# Patient Record
Sex: FEMALE | Race: WHITE | Employment: OTHER | URBAN - NONMETROPOLITAN AREA
[De-identification: names, ages, dates, MRNs, and addresses within clinical notes are randomized per-mention and may not be internally consistent; named-entity substitution may affect disease eponyms.]

---

## 2017-04-28 ENCOUNTER — TELEPHONE (OUTPATIENT)
Dept: FAMILY MEDICINE CLINIC | Age: 77
End: 2017-04-28

## 2017-05-03 ENCOUNTER — CARE COORDINATION (OUTPATIENT)
Dept: CARE COORDINATION | Age: 77
End: 2017-05-03

## 2017-05-03 ENCOUNTER — TELEPHONE (OUTPATIENT)
Dept: FAMILY MEDICINE CLINIC | Age: 77
End: 2017-05-03

## 2017-05-09 ENCOUNTER — OFFICE VISIT (OUTPATIENT)
Dept: FAMILY MEDICINE CLINIC | Age: 77
End: 2017-05-09

## 2017-05-09 VITALS
SYSTOLIC BLOOD PRESSURE: 116 MMHG | TEMPERATURE: 98.2 F | DIASTOLIC BLOOD PRESSURE: 64 MMHG | WEIGHT: 113 LBS | OXYGEN SATURATION: 96 % | HEART RATE: 74 BPM | HEIGHT: 60 IN | BODY MASS INDEX: 22.19 KG/M2

## 2017-05-09 DIAGNOSIS — K52.9 GASTROENTERITIS: ICD-10-CM

## 2017-05-09 DIAGNOSIS — I10 ESSENTIAL HYPERTENSION, BENIGN: ICD-10-CM

## 2017-05-09 DIAGNOSIS — K58.0 IRRITABLE BOWEL SYNDROME WITH DIARRHEA: ICD-10-CM

## 2017-05-09 DIAGNOSIS — M54.50 CHRONIC MIDLINE LOW BACK PAIN WITHOUT SCIATICA: ICD-10-CM

## 2017-05-09 DIAGNOSIS — M54.50 MIDLINE LOW BACK PAIN WITHOUT SCIATICA, UNSPECIFIED CHRONICITY: ICD-10-CM

## 2017-05-09 DIAGNOSIS — G89.29 CHRONIC MIDLINE LOW BACK PAIN WITHOUT SCIATICA: ICD-10-CM

## 2017-05-09 DIAGNOSIS — N39.46 MIXED INCONTINENCE: ICD-10-CM

## 2017-05-09 DIAGNOSIS — F31.75 BIPOLAR DISORDER, IN PARTIAL REMISSION, MOST RECENT EPISODE DEPRESSED (HCC): Primary | ICD-10-CM

## 2017-05-09 DIAGNOSIS — M15.9 PRIMARY OSTEOARTHRITIS INVOLVING MULTIPLE JOINTS: ICD-10-CM

## 2017-05-09 PROCEDURE — 99214 OFFICE O/P EST MOD 30 MIN: CPT | Performed by: FAMILY MEDICINE

## 2017-05-09 RX ORDER — DARIFENACIN HYDROBROMIDE 7.5 MG/1
7.5 TABLET, EXTENDED RELEASE ORAL DAILY
COMMUNITY
End: 2020-04-14

## 2017-05-12 PROBLEM — N39.46 MIXED INCONTINENCE: Status: ACTIVE | Noted: 2017-05-12

## 2017-05-17 RX ORDER — ESOMEPRAZOLE MAGNESIUM 40 MG/1
CAPSULE, DELAYED RELEASE ORAL
Qty: 30 CAPSULE | Refills: 0 | Status: SHIPPED | OUTPATIENT
Start: 2017-05-17 | End: 2017-06-20 | Stop reason: SDUPTHER

## 2017-05-24 ENCOUNTER — OFFICE VISIT (OUTPATIENT)
Dept: FAMILY MEDICINE CLINIC | Age: 77
End: 2017-05-24

## 2017-05-24 VITALS
SYSTOLIC BLOOD PRESSURE: 126 MMHG | HEIGHT: 60 IN | WEIGHT: 113.6 LBS | OXYGEN SATURATION: 97 % | BODY MASS INDEX: 22.3 KG/M2 | HEART RATE: 118 BPM | DIASTOLIC BLOOD PRESSURE: 76 MMHG

## 2017-05-24 DIAGNOSIS — Z23 NEED FOR PROPHYLACTIC VACCINATION AGAINST DIPHTHERIA-TETANUS-PERTUSSIS (DTP): Primary | ICD-10-CM

## 2017-05-24 DIAGNOSIS — I10 ESSENTIAL HYPERTENSION, BENIGN: ICD-10-CM

## 2017-05-24 DIAGNOSIS — M15.9 PRIMARY OSTEOARTHRITIS INVOLVING MULTIPLE JOINTS: ICD-10-CM

## 2017-05-24 DIAGNOSIS — M54.50 CHRONIC MIDLINE LOW BACK PAIN WITHOUT SCIATICA: ICD-10-CM

## 2017-05-24 DIAGNOSIS — J43.1 PANLOBULAR EMPHYSEMA (HCC): ICD-10-CM

## 2017-05-24 DIAGNOSIS — F31.75 BIPOLAR DISORDER, IN PARTIAL REMISSION, MOST RECENT EPISODE DEPRESSED (HCC): ICD-10-CM

## 2017-05-24 DIAGNOSIS — D64.9 ANEMIA, UNSPECIFIED TYPE: ICD-10-CM

## 2017-05-24 DIAGNOSIS — E78.2 MIXED HYPERLIPIDEMIA: ICD-10-CM

## 2017-05-24 DIAGNOSIS — N39.46 MIXED INCONTINENCE: ICD-10-CM

## 2017-05-24 DIAGNOSIS — G89.29 CHRONIC MIDLINE LOW BACK PAIN WITHOUT SCIATICA: ICD-10-CM

## 2017-05-24 DIAGNOSIS — L65.9 ALOPECIA: ICD-10-CM

## 2017-05-24 PROCEDURE — 99214 OFFICE O/P EST MOD 30 MIN: CPT | Performed by: FAMILY MEDICINE

## 2017-05-24 RX ORDER — TIMOLOL MALEATE 5 MG/ML
SOLUTION/ DROPS OPHTHALMIC
COMMUNITY
Start: 2017-05-15 | End: 2017-10-03 | Stop reason: SDUPTHER

## 2017-06-13 ENCOUNTER — NURSE ONLY (OUTPATIENT)
Dept: FAMILY MEDICINE CLINIC | Age: 77
End: 2017-06-13

## 2017-06-13 ENCOUNTER — TELEPHONE (OUTPATIENT)
Dept: FAMILY MEDICINE CLINIC | Age: 77
End: 2017-06-13

## 2017-06-13 DIAGNOSIS — E78.2 MIXED HYPERLIPIDEMIA: ICD-10-CM

## 2017-06-13 DIAGNOSIS — L65.9 ALOPECIA: ICD-10-CM

## 2017-06-13 DIAGNOSIS — D64.9 ANEMIA, UNSPECIFIED TYPE: ICD-10-CM

## 2017-06-13 LAB
ALBUMIN SERPL-MCNC: 4.2 G/DL (ref 3.4–5)
ALP BLD-CCNC: 51 U/L (ref 40–129)
ALT SERPL-CCNC: 10 U/L (ref 10–40)
AST SERPL-CCNC: 17 U/L (ref 15–37)
BASOPHILS ABSOLUTE: 0.1 K/UL (ref 0–0.2)
BASOPHILS RELATIVE PERCENT: 1.8 %
BILIRUB SERPL-MCNC: <0.2 MG/DL (ref 0–1)
BILIRUBIN DIRECT: <0.2 MG/DL (ref 0–0.3)
BILIRUBIN, INDIRECT: NORMAL MG/DL (ref 0–1)
CHOLESTEROL, TOTAL: 236 MG/DL (ref 0–199)
EOSINOPHILS ABSOLUTE: 0.8 K/UL (ref 0–0.6)
EOSINOPHILS RELATIVE PERCENT: 9.5 %
HCT VFR BLD CALC: 34.8 % (ref 36–48)
HDLC SERPL-MCNC: 65 MG/DL (ref 40–60)
HEMOGLOBIN: 11.3 G/DL (ref 12–16)
LDL CHOLESTEROL CALCULATED: 121 MG/DL
LYMPHOCYTES ABSOLUTE: 2.7 K/UL (ref 1–5.1)
LYMPHOCYTES RELATIVE PERCENT: 33.4 %
MCH RBC QN AUTO: 30.2 PG (ref 26–34)
MCHC RBC AUTO-ENTMCNC: 32.4 G/DL (ref 31–36)
MCV RBC AUTO: 93.3 FL (ref 80–100)
MONOCYTES ABSOLUTE: 0.9 K/UL (ref 0–1.3)
MONOCYTES RELATIVE PERCENT: 11.2 %
NEUTROPHILS ABSOLUTE: 3.6 K/UL (ref 1.7–7.7)
NEUTROPHILS RELATIVE PERCENT: 44.1 %
PDW BLD-RTO: 13.9 % (ref 12.4–15.4)
PLATELET # BLD: 334 K/UL (ref 135–450)
PMV BLD AUTO: 9 FL (ref 5–10.5)
RBC # BLD: 3.73 M/UL (ref 4–5.2)
T4 FREE: 1.1 NG/DL (ref 0.9–1.8)
TOTAL PROTEIN: 7 G/DL (ref 6.4–8.2)
TRIGL SERPL-MCNC: 252 MG/DL (ref 0–150)
TSH SERPL DL<=0.05 MIU/L-ACNC: 5.2 UIU/ML (ref 0.27–4.2)
VLDLC SERPL CALC-MCNC: 50 MG/DL
WBC # BLD: 8.2 K/UL (ref 4–11)

## 2017-06-13 PROCEDURE — 36415 COLL VENOUS BLD VENIPUNCTURE: CPT | Performed by: FAMILY MEDICINE

## 2017-06-19 DIAGNOSIS — E78.2 MIXED HYPERLIPIDEMIA: Primary | ICD-10-CM

## 2017-06-21 RX ORDER — ESOMEPRAZOLE MAGNESIUM 40 MG/1
CAPSULE, DELAYED RELEASE ORAL
Qty: 30 CAPSULE | Refills: 0 | Status: SHIPPED | OUTPATIENT
Start: 2017-06-21 | End: 2017-07-21 | Stop reason: SDUPTHER

## 2017-07-03 ENCOUNTER — HOSPITAL ENCOUNTER (OUTPATIENT)
Dept: OTHER | Age: 77
Discharge: HOME | End: 2017-07-03
Attending: INTERNAL MEDICINE | Admitting: INTERNAL MEDICINE

## 2017-07-03 VITALS
TEMPERATURE: 98 F | BODY MASS INDEX: 22.58 KG/M2 | SYSTOLIC BLOOD PRESSURE: 173 MMHG | RESPIRATION RATE: 16 BRPM | HEART RATE: 86 BPM | HEIGHT: 60 IN | OXYGEN SATURATION: 92 % | WEIGHT: 115 LBS | DIASTOLIC BLOOD PRESSURE: 86 MMHG

## 2017-07-03 RX ORDER — SODIUM CHLORIDE, SODIUM LACTATE, POTASSIUM CHLORIDE, CALCIUM CHLORIDE 600; 310; 30; 20 MG/100ML; MG/100ML; MG/100ML; MG/100ML
INJECTION, SOLUTION INTRAVENOUS CONTINUOUS
Status: DISCONTINUED | OUTPATIENT
Start: 2017-07-03 | End: 2017-07-04 | Stop reason: HOSPADM

## 2017-07-03 RX ADMIN — SODIUM CHLORIDE, SODIUM LACTATE, POTASSIUM CHLORIDE, CALCIUM CHLORIDE: 600; 310; 30; 20 INJECTION, SOLUTION INTRAVENOUS at 07:54

## 2017-07-03 ASSESSMENT — PAIN DESCRIPTION - DESCRIPTORS: DESCRIPTORS: ACHING

## 2017-07-03 ASSESSMENT — PAIN - FUNCTIONAL ASSESSMENT: PAIN_FUNCTIONAL_ASSESSMENT: 0-10

## 2017-07-21 RX ORDER — ESOMEPRAZOLE MAGNESIUM 40 MG/1
CAPSULE, DELAYED RELEASE ORAL
Qty: 30 CAPSULE | Refills: 0 | Status: SHIPPED | OUTPATIENT
Start: 2017-07-21 | End: 2017-08-21 | Stop reason: SDUPTHER

## 2017-07-25 DIAGNOSIS — J30.89 ENVIRONMENTAL AND SEASONAL ALLERGIES: ICD-10-CM

## 2017-07-25 DIAGNOSIS — M15.9 PRIMARY OSTEOARTHRITIS INVOLVING MULTIPLE JOINTS: ICD-10-CM

## 2017-07-25 RX ORDER — LORATADINE 10 MG/1
TABLET ORAL
Qty: 90 TABLET | Refills: 3 | Status: SHIPPED | OUTPATIENT
Start: 2017-07-25

## 2017-08-21 RX ORDER — ESOMEPRAZOLE MAGNESIUM 40 MG/1
CAPSULE, DELAYED RELEASE ORAL
Qty: 30 CAPSULE | Refills: 0 | Status: SHIPPED | OUTPATIENT
Start: 2017-08-21 | End: 2017-10-03 | Stop reason: SDUPTHER

## 2017-08-28 RX ORDER — ESOMEPRAZOLE MAGNESIUM 40 MG/1
CAPSULE, DELAYED RELEASE ORAL
Qty: 30 CAPSULE | Refills: 0 | Status: SHIPPED | OUTPATIENT
Start: 2017-08-28 | End: 2017-10-03 | Stop reason: SDUPTHER

## 2017-10-03 ENCOUNTER — OFFICE VISIT (OUTPATIENT)
Dept: FAMILY MEDICINE CLINIC | Age: 77
End: 2017-10-03

## 2017-10-03 VITALS
WEIGHT: 131.6 LBS | OXYGEN SATURATION: 98 % | HEART RATE: 76 BPM | HEIGHT: 60 IN | SYSTOLIC BLOOD PRESSURE: 122 MMHG | BODY MASS INDEX: 25.84 KG/M2 | DIASTOLIC BLOOD PRESSURE: 66 MMHG

## 2017-10-03 DIAGNOSIS — M15.9 PRIMARY OSTEOARTHRITIS INVOLVING MULTIPLE JOINTS: ICD-10-CM

## 2017-10-03 DIAGNOSIS — N39.46 MIXED INCONTINENCE: ICD-10-CM

## 2017-10-03 DIAGNOSIS — E78.2 MIXED HYPERLIPIDEMIA: Primary | ICD-10-CM

## 2017-10-03 DIAGNOSIS — K21.9 GASTROESOPHAGEAL REFLUX DISEASE WITHOUT ESOPHAGITIS: ICD-10-CM

## 2017-10-03 DIAGNOSIS — R73.9 HYPERGLYCEMIA: ICD-10-CM

## 2017-10-03 DIAGNOSIS — I10 ESSENTIAL HYPERTENSION, BENIGN: ICD-10-CM

## 2017-10-03 DIAGNOSIS — F41.9 ANXIETY: ICD-10-CM

## 2017-10-03 DIAGNOSIS — Z23 NEED FOR INFLUENZA VACCINATION: ICD-10-CM

## 2017-10-03 DIAGNOSIS — G51.0 FACIAL PARALYSIS/BELLS PALSY: Chronic | ICD-10-CM

## 2017-10-03 DIAGNOSIS — F31.75 BIPOLAR DISORDER, IN PARTIAL REMISSION, MOST RECENT EPISODE DEPRESSED (HCC): ICD-10-CM

## 2017-10-03 LAB
ALBUMIN SERPL-MCNC: 4 G/DL (ref 3.4–5)
ALP BLD-CCNC: 69 U/L (ref 40–129)
ALT SERPL-CCNC: 13 U/L (ref 10–40)
AST SERPL-CCNC: 17 U/L (ref 15–37)
BILIRUB SERPL-MCNC: <0.2 MG/DL (ref 0–1)
BILIRUBIN DIRECT: <0.2 MG/DL (ref 0–0.3)
BILIRUBIN, INDIRECT: NORMAL MG/DL (ref 0–1)
CHOLESTEROL, TOTAL: 238 MG/DL (ref 0–199)
HDLC SERPL-MCNC: 56 MG/DL (ref 40–60)
LDL CHOLESTEROL CALCULATED: ABNORMAL MG/DL
LDL CHOLESTEROL DIRECT: 122 MG/DL
TOTAL PROTEIN: 7 G/DL (ref 6.4–8.2)
TRIGL SERPL-MCNC: 383 MG/DL (ref 0–150)
VLDLC SERPL CALC-MCNC: ABNORMAL MG/DL

## 2017-10-03 PROCEDURE — G0008 ADMIN INFLUENZA VIRUS VAC: HCPCS | Performed by: FAMILY MEDICINE

## 2017-10-03 PROCEDURE — 90688 IIV4 VACCINE SPLT 0.5 ML IM: CPT | Performed by: FAMILY MEDICINE

## 2017-10-03 PROCEDURE — 36415 COLL VENOUS BLD VENIPUNCTURE: CPT | Performed by: FAMILY MEDICINE

## 2017-10-03 PROCEDURE — 99214 OFFICE O/P EST MOD 30 MIN: CPT | Performed by: FAMILY MEDICINE

## 2017-10-03 RX ORDER — ESOMEPRAZOLE MAGNESIUM 40 MG/1
CAPSULE, DELAYED RELEASE ORAL
Qty: 30 CAPSULE | Refills: 11 | Status: SHIPPED | OUTPATIENT
Start: 2017-10-03

## 2017-10-03 RX ORDER — TIMOLOL MALEATE 5 MG/ML
1 SOLUTION/ DROPS OPHTHALMIC 2 TIMES DAILY
Qty: 1 BOTTLE | Refills: 1 | Status: SHIPPED | OUTPATIENT
Start: 2017-10-03

## 2017-10-03 NOTE — PATIENT INSTRUCTIONS

## 2017-10-03 NOTE — MR AVS SNAPSHOT
After Visit Summary             Saniya Roman   10/3/2017 8:40 AM   Office Visit    Description:  Female : 1940   Provider:  Hollie Giles MD   Department:  [facility]              Your Follow-Up and Future Appointments         Below is a list of your follow-up and future appointments. This may not be a complete list as you may have made appointments directly with providers that we are not aware of or your providers may have made some for you. Please call your providers to confirm appointments. It is important to keep your appointments. Please bring your current insurance card, photo ID, co-pay, and all medication bottles to your appointment. If self-pay, payment is expected at the time of service. Your To-Do List     Future Appointments Provider Department Dept Phone    2018 10:40 AM Hollie Giles MD [address] [place] [address] [phone]    Please arrive 15 minutes prior to appointment, bring photo ID and insurance card. Follow-Up    Return in about 4 months (around 2/3/2018), or HTN;hyperlipidemia;GERD;hyperglycemia. Information from Your Visit        Department     Name Address Phone Fax    [address] [place] [address] [address] [facility] [phone]      You Were Seen for:         Comments    Need for influenza vaccination   [[id number]]         Vital Signs     Blood Pressure Pulse Height Weight Last Menstrual Period Oxygen Saturation    122/66 (Site: Left Arm, Position: Sitting, Cuff Size: Medium Adult) 76 4' 11.5\" (1.511 m) 131 lb 9.6 oz (59.7 kg) (LMP Unknown) 98%    Body Mass Index Smoking Status                26.14 kg/m2 Never Smoker          Additional Information about your Body Mass Index (BMI)           Your BMI as listed above is considered overweight (25.0-29.9). BMI is an estimate of body fat, calculated from your height and weight.   The higher Lyrica [Pregabalin] Nausea And Vomiting    Penicillins Nausea And Vomiting    Propoxyphene Nausea And Vomiting    Sulfa Antibiotics Nausea And Vomiting    Tuberculin Tests Nausea And Vomiting    Vioxx Nausea And Vomiting      We Ordered/Performed the following           HEPATIC FUNCTION PANEL     INFLUENZA, QUADV, 3 YRS AND OLDER, IM, MDV, 0.5ML (FLUZONE QUADV)     LIPID PANEL          Additional Information        Basic Information     Date Of Birth Sex Race Ethnicity Preferred Language    1940 Female White Non-/Non  English      Problem List as of 10/3/2017  Date Reviewed: 10/3/2017                Mixed incontinence    Mixed hyperlipidemia    Hyperglycemia    Panlobular emphysema ([facility] [address]    Bipolar disorder, in partial remission, most recent episode depressed ([facility] [address]    Primary osteoarthritis involving multiple joints    Midline low back pain without sciatica    Seborrheic keratosis    Low back pain    Essential hypertension, benign    Costochondritis    Facial paralysis/[place] palsy (Chronic)    Nausea without vomiting    GERD (gastroesophageal reflux disease)    Insomnia    Anxiety    Memory loss      Your Goals as of 10/3/2017 at 9:27 AM              Today    5/24/17 12/12/16       Lifestyle    Schedule follow up appointment   On track  On track  On track    Notes    ? I will schedule the recommended follow up visit when leaving the office today, and agree to keep the appointment or to reschedule when I call to cancel. ?       medications   On track  On track      Notes    I will take all medications, as prescribed by my doctor, and I will call the office if I am having any medication problems. healthy diet           Notes    I will follow a healthy diet, as suggested by my doctor. Vaccines           Notes    I will consider obtaining any vaccines recommended by my doctor.           Immunizations as of 10/3/2017     Name Date Influenza Virus Vaccine 9/23/2015, 10/28/2014, 10/30/2013    Influenza, Scott Sandoval, 3 Years and older, IM 10/3/2017, 9/12/2016    Pneumococcal 13-valent Conjugate ([id number]) 9/23/2015    Pneumococcal Polysaccharide ([id number]) 12/12/2016    Tdap (Boostrix, Adacel) 6/2/2017, 6/2/2017      Preventive Care        Date Due    Cholesterol Screening 6/13/2022    Tetanus Combination Vaccine (2 - Td) 6/2/2027            MyChart Signup           [facility] allows you to send messages to your doctor, view your test results, renew your prescriptions, schedule appointments, view visit notes, and more. How Do I Sign Up? 1. In your Internet browser, go to [email or web address]  2. Click on the Sign Up Now link in the Sign In box. You will see the New Member Sign Up page. 3. Enter your [facility] Access Code exactly as it appears below. You will not need to use this code after youve completed the sign-up process. If you do not sign up before the expiration date, you must request a new code. [facility] Access Code: [email or web address]  Expires: 12/2/2017  9:27 AM    4. Enter your Social Security Number ([id number]) and Date of Birth (mm/dd/yyyy) as indicated and click Submit. You will be taken to the next sign-up page. 5. Create a [facility] ID. This will be your [facility] login ID and cannot be changed, so think of one that is secure and easy to remember. 6. Create a [facility] password. You can change your password at any time. 7. Enter your Password Reset Question and Answer. This can be used at a later time if you forget your password. 8. Enter your e-mail address. You will receive e-mail notification when new information is available in [address] 9. Click Sign Up. You can now view your medical record. Additional Information  If you have questions, please contact the physician practice where you receive care. Remember, [facility] is NOT to be used for urgent needs. For medical emergencies, dial 911.

## 2017-10-03 NOTE — PROGRESS NOTES
SUBJECTIVE:    10/3/2017    Deonna Roman (: 1940)    65 y.o.    female    Prior to Visit Medications    Medication Sig Taking? Authorizing Provider   esomeprazole (NEXIUM) 40 MG delayed release capsule TAKE ONE CAPSULE BY MOUTH ONCE DAILY Yes Faustino Obrien MD   esomeprazole ([address]) 40 MG delayed release capsule TAKE ONE CAPSULE BY MOUTH ONCE DAILY Yes Faustino Obrien MD   loratadine (CLARITIN) 10 MG tablet TAKE ONE TABLET BY MOUTH ONCE DAILY Yes Faustnio Obrien MD   timolol (TIMOPTIC) 0.5 % ophthalmic solution  Yes Historical Provider, MD   minoxidil (ROGAINE) 2 % external solution Apply topically 2 times daily. Yes Faustino Obrien MD   darifenacin (ENABLEX) 7.5 MG extended release tablet Take 7.5 mg by mouth daily Yes Historical Provider, MD   Multiple Vitamins-Minerals (THERAPEUTIC MULTIVITAMIN-MINERALS) tablet Take 1 tablet by mouth daily Yes Historical Provider, MD   diphenhydrAMINE-APAP, sleep, (TYLENOL PM EXTRA STRENGTH)  MG tablet Take 1 tablet by mouth nightly as needed for Sleep Yes Historical Provider, MD   ARIPiprazole (ABILIFY PO) Take by mouth Yes Historical Provider, MD   lidocaine (LIDODERM) 5 % Place 1 patch onto the skin daily 12 hours on, 12 hours off. Yes Nicki Shoulders, NP   meloxicam (MOBIC) 15 MG tablet TAKE ONE TABLET BY MOUTH ONCE DAILY Yes Faustino Obrien MD   trimethoprim (TRIMPEX) 100 MG tablet Take 100 mg by mouth daily  Yes Historical Provider, MD   PARoxetine (PAXIL) 30 MG tablet Take 1 tablet by mouth every morning Yes Faustino Obrien MD   diltiazem (CARDIZEM CD) 120 MG ER capsule TAKE ONE CAPSULE BY MOUTH ONCE DAILY Yes Faustino Obrien MD   Gentamicin Sulfate (GENTAK OP) Apply  to eye 2 times daily. Yes Historical Provider, MD   aspirin 325 MG tablet Take 325 mg by mouth daily. Yes Historical Provider, MD   ALPRAZolam Carson Pian) 2 MG tablet Take 2 mg by mouth nightly as needed.  Yes Historical Provider, MD       ALLERGIES:  Allergies   Allergen Reactions    Amoxicillin Nausea And Vomiting    Ampicillin Nausea And Vomiting    Biaxin [Clarithromycin] Nausea And Vomiting    Ceclor [Cefaclor] Nausea And Vomiting    Cephalexin Nausea And Vomiting    Erythromycin Nausea And Vomiting    Gabapentin Nausea And Vomiting    Iodides Nausea And Vomiting    Lyrica [Pregabalin] Nausea And Vomiting    Penicillins Nausea And Vomiting    Propoxyphene Nausea And Vomiting    Sulfa Antibiotics Nausea And Vomiting    Tuberculin Tests Nausea And Vomiting    Vioxx Nausea And Vomiting       Chief Complaint   Patient presents with    Hypertension     Medication compliant. Does not check BP at home. Watching salt intake.  Hyperlipidemia     Watching diet.  Depression     She is very depressed over son's health issues.  Back Pain     Chronic upper, mid, and low back pain x several years. Her pain has been worse. She has gotten new mattress to see if this would help, but it has not helped. Son is not doing well with his cancer of the mouth and his other conditions. She has gotten her tdap injection. She wants her flu vaccine today. She has had a colonoscopy with Dr Hitesh Higgins, biopsy was OK, she is having less diarrhea now. She will use Imodium as directed by Dr Hitesh Higgins. She did try Rogaine for her hair thinning and thinks it is helping. Nexium does help her reflux. She states she has a hard time getting prescriptions from Dr Amanda Austin. The Vesicare worked well but she had to pay for it out of pocket. Is only using tylenol for her pain, so will f/u in 4 months. HPI  Aureliano Madison she is quite bothered by her son's health. As far as her nerves and she thinks she is more depressed, she appears to be about the same. She still has times where her back hurts her, will use some Tylenol or ibuprofen. No longer on pain medicine.   She gets her Xanax from Dr. Nedra Urbina, her psychiatrist.  Pain is described in her upper mid and low back pain. She got a new mattress but really not sleeping any better and again her backs no better. Is here for high blood pressure. Watching salt intake. Blood pressure checked at home - no. Numbers good if checked? NA. Denies chest pain. Denies shortness of breath. Taking medications as prescribed. Is here for cholesterol. Tolerating medication when she had it, but she had ran out, she states that she did not stop it on her own. Our records indicate that she no longer has been receiving pravastatin. Trying to watch low-fat diet. Weight stable. No change in exercise. HX: (> 3 status chronic/inact. Prob) or  Wt Readings from Last 3 Encounters:   10/03/17 131 lb 9.6 oz (59.7 kg)   07/03/17 115 lb (52.2 kg)   05/24/17 113 lb 9.6 oz (51.5 kg)       Occupation Homemaker              HPI:  (>4 )  LOCATION:  QUALITY:SEVERITY:  DURATION:  TIMING:  CONTEXT:  MOD. FACTORS:  ASSOC. S/S:    Pertinent items are noted in HPI.  (+/- 2-9 systems)  ROS  All other systems reviewed and are negative except as noted above  Additional review of systems may be scanned into the media section of this medical record. Any responses requiring further intervention were pursued.     Past Medical History:   Diagnosis Date    Anxiety     Arthritis     Bell's palsy     Depression     Emphysema     GERD (gastroesophageal reflux disease)     Hyperlipidemia     Hypertension     Insomnia     Memory loss     No history of procedure 07/03/2017    colonoscopy    Osteoarthritis     Other disorders of kidney and ureter     S/P angioplasty     Unspecified cerebral artery occlusion with cerebral infarction        Family History   Problem Relation Age of Onset    Diabetes Mother        Social History   Substance Use Topics    Smoking status: Never Smoker    Smokeless tobacco: Never Used    Alcohol use No         Past Surgical History:   Procedure Laterality Date    ANGIOPLASTY       MPV 06/13/2017 9.0     Neutrophils % 06/13/2017 44.1     Lymphocytes % 06/13/2017 33.4     Monocytes % 06/13/2017 11.2     Eosinophils % 06/13/2017 9.5     Basophils % 06/13/2017 1.8     Neutrophils # 06/13/2017 3.6     Lymphocytes # 06/13/2017 2.7     Monocytes # 06/13/2017 0.9     Eosinophils # 06/13/2017 0.8*    Basophils # 06/13/2017 0.1        Physical Exam   Constitutional: She is oriented to person, place, and time. She appears well-developed and well-nourished. No distress. HENT:   Head: Normocephalic and atraumatic. Right Ear: External ear normal.   Left Ear: External ear normal.   Nose: Nose normal.   Eyes: Conjunctivae and EOM are normal. Pupils are equal, round, and reactive to light. Right eye exhibits no discharge. Left eye exhibits no discharge. No scleral icterus. Neck: Normal range of motion. Neck supple. No JVD present. No tracheal deviation present. No thyromegaly present. Cardiovascular: Normal rate, regular rhythm, normal heart sounds and intact distal pulses. Exam reveals no gallop and no friction rub. No murmur heard. Pulmonary/Chest: Effort normal and breath sounds normal. No stridor. No respiratory distress. She has no wheezes. She has no rales. She exhibits no tenderness. Abdominal: Soft. Bowel sounds are normal. She exhibits no distension and no mass. There is no tenderness. There is no guarding. Musculoskeletal: She exhibits no edema. Right shoulder: She exhibits decreased range of motion. Left shoulder: She exhibits decreased range of motion. Right elbow: She exhibits normal range of motion. Left elbow: She exhibits normal range of motion. Right hip: She exhibits decreased range of motion. Left hip: She exhibits decreased range of motion. Right knee: She exhibits decreased range of motion. Left knee: She exhibits decreased range of motion. Right ankle: She exhibits normal range of motion. Left ankle: She exhibits normal range of motion. Lumbar back: She exhibits decreased range of motion and tenderness. Right upper leg: She exhibits tenderness. Left upper leg: She exhibits tenderness. Lymphadenopathy:        Head (right side): No submental, no submandibular, no tonsillar, no preauricular, no posterior auricular and no occipital adenopathy present. Head (left side): No submental, no submandibular, no tonsillar, no preauricular, no posterior auricular and no occipital adenopathy present. She has no cervical adenopathy. Right: No supraclavicular adenopathy present. Left: No supraclavicular adenopathy present. Neurological: She is alert and oriented to person, place, and time. She exhibits normal muscle tone. Coordination normal.   Right eye open more than the left and lack of facial expression on the right consistent with prior Bell's palsy   Skin: Skin is warm and dry. No rash noted. She is not diaphoretic. No erythema. No pallor. Psychiatric: She has a normal mood and affect. Her behavior is normal. Judgment and thought content normal.   Nursing note and vitals reviewed. ASSESSMENT:    Assessment/Plan:  Dru Harris was seen today for hypertension, hyperlipidemia, depression and back pain.     Diagnoses and all orders for this visit:    Mixed hyperlipidemia  -     LIPID PANEL  -     Cancel: HEPATIC FUNCTION PANEL  -     HEPATIC FUNCTION PANEL    Need for influenza vaccination  -     INFLUENZA, QUADV, 3 YRS AND OLDER, IM, MDV, 0.5ML (FLUZONE QUADV)    Mixed incontinence    Hyperglycemia    Primary osteoarthritis involving multiple joints    Bipolar disorder, in partial remission, most recent episode depressed ([facility] [address]    Essential hypertension, benign    Anxiety    Facial paralysis/[place] palsy    Gastroesophageal reflux disease without esophagitis  -     esomeprazole (NEXIUM) 40 MG delayed release capsule; TAKE ONE CAPSULE BY MOUTH ONCE DAILY    Other orders  -     timolol (TIMOPTIC) 0.5 % ophthalmic solution; Place 1 drop into both eyes 2 times daily          PLAN:    Patient's nerves I think her baseline despite the stress with her son. Blood pressure is acceptable. Lipids been acceptable, await results and determine if we need to restart pravastatin. Dr. Emili Zuniga continues to manage her incontinence. She has not used pain medicine for her back for some time, is on no controlled substances for us, and use Tylenol or anti-inflammatories for her back as needed. May follow-up in 4 months. Reflux controlled on Nexium. Patient should call the office immediately with new or ongoing signs or symptoms or worsening, or proceed to the emergency room. All entries in chief complaint and history of present illness are reviewed and validated by me. No changes in past medical history, past surgical history, social history, or family history were noted during the patient encounter unless specifically listed above. All updates of past medical history, past surgical history, social history, or family history were reviewed personally by me during the office visit. All problems listed in the assessment are stable unless noted otherwise. Medication profile reviewed personally by me during the office visit. Medication side effects and possible impairments from medications were discussed as applicable. Every effort has been made to assure accurate transcription by this voice recognition software. However, mistakes in transcription may still occur      ITara am scribing for and in the presence of Dr Em Oshea. 10/3/2017      8:45 AM      I, Dr. Shelbi Sanchez, personally performed the services described in this documentation, as scribed by Tara William RN, in my presence, and it is both accurate and complete.  I agree with the Chief Complaint, ROS, and Past Histories independently gathered by the clinical support staff and the remaining scribed note accurately describes my personal service to the patient.     10/3/2017    8:44 AM

## 2017-10-04 DIAGNOSIS — E78.2 MIXED HYPERLIPIDEMIA: Primary | ICD-10-CM

## 2017-10-04 RX ORDER — PRAVASTATIN SODIUM 40 MG
40 TABLET ORAL EVERY EVENING
Qty: 90 TABLET | Refills: 3 | Status: SHIPPED | OUTPATIENT
Start: 2017-10-04

## 2017-11-02 ENCOUNTER — TELEPHONE (OUTPATIENT)
Dept: FAMILY MEDICINE CLINIC | Age: 77
End: 2017-11-02

## 2017-11-02 DIAGNOSIS — J02.9 ACUTE VIRAL PHARYNGITIS: Primary | ICD-10-CM

## 2017-11-02 RX ORDER — PREDNISONE 20 MG/1
20 TABLET ORAL DAILY
Qty: 5 TABLET | Refills: 0 | Status: SHIPPED | OUTPATIENT
Start: 2017-11-02 | End: 2017-11-07

## 2017-11-02 NOTE — TELEPHONE ENCOUNTER
Any other symptoms? Cough, nausea, sinus pain, sinus pressure, rhinorrhea, sneezing, chills, fatigue, diarrhea?

## 2017-11-09 NOTE — TELEPHONE ENCOUNTER
Pt is wanting to know if she can get a refill on the pain meds because she is still in a lot of pain. Please advise.

## 2017-11-10 RX ORDER — HYDROCODONE BITARTRATE AND ACETAMINOPHEN 5; 325 MG/1; MG/1
1 TABLET ORAL EVERY 4 HOURS PRN
Qty: 15 TABLET | Refills: 0 | OUTPATIENT
Start: 2017-11-10

## 2017-11-10 NOTE — TELEPHONE ENCOUNTER
10 hydrocodone were written by ana Lomas in October.   Please clarify this with the patient before we approve

## 2017-12-16 DIAGNOSIS — M15.9 PRIMARY OSTEOARTHRITIS INVOLVING MULTIPLE JOINTS: ICD-10-CM

## 2017-12-18 RX ORDER — MELOXICAM 15 MG/1
TABLET ORAL
Qty: 90 TABLET | Refills: 3 | Status: SHIPPED | OUTPATIENT
Start: 2017-12-18 | End: 2019-07-30 | Stop reason: ALTCHOICE

## 2018-02-12 ENCOUNTER — TELEPHONE (OUTPATIENT)
Dept: FAMILY MEDICINE CLINIC | Age: 78
End: 2018-02-12

## 2018-02-26 ENCOUNTER — INITIAL CONSULT (OUTPATIENT)
Dept: NEUROLOGY | Age: 78
End: 2018-02-26

## 2018-02-26 VITALS
DIASTOLIC BLOOD PRESSURE: 75 MMHG | HEART RATE: 72 BPM | HEIGHT: 59 IN | BODY MASS INDEX: 25.6 KG/M2 | SYSTOLIC BLOOD PRESSURE: 138 MMHG | WEIGHT: 127 LBS | OXYGEN SATURATION: 99 %

## 2018-02-26 DIAGNOSIS — H53.2 DOUBLE VISION: Primary | ICD-10-CM

## 2018-02-26 DIAGNOSIS — D33.1 BENIGN NEOPLASM OF INFRATENTORIAL REGION OF BRAIN (HCC): ICD-10-CM

## 2018-02-26 DIAGNOSIS — G51.0 BELL'S PALSY: ICD-10-CM

## 2018-02-26 DIAGNOSIS — F31.75 BIPOLAR DISORDER, IN PARTIAL REMISSION, MOST RECENT EPISODE DEPRESSED (HCC): ICD-10-CM

## 2018-02-26 DIAGNOSIS — I10 HTN (HYPERTENSION), BENIGN: ICD-10-CM

## 2018-02-26 DIAGNOSIS — Z86.73 REMOTE HISTORY OF STROKE: ICD-10-CM

## 2018-02-26 PROCEDURE — 99204 OFFICE O/P NEW MOD 45 MIN: CPT | Performed by: PSYCHIATRY & NEUROLOGY

## 2018-02-26 ASSESSMENT — ENCOUNTER SYMPTOMS
DOUBLE VISION: 1
GASTROINTESTINAL NEGATIVE: 1
RESPIRATORY NEGATIVE: 1

## 2018-02-26 NOTE — PROGRESS NOTES
Cephalexin Nausea And Vomiting    Erythromycin Nausea And Vomiting    Gabapentin Nausea And Vomiting    Iodides Nausea And Vomiting    Lyrica [Pregabalin] Nausea And Vomiting    Penicillins Nausea And Vomiting    Propoxyphene Nausea And Vomiting    Sulfa Antibiotics Nausea And Vomiting    Tuberculin Tests Nausea And Vomiting    Vioxx Nausea And Vomiting     Social History   Substance Use Topics    Smoking status: Never Smoker    Smokeless tobacco: Never Used    Alcohol use No     Family History   Problem Relation Age of Onset    Diabetes Mother      Past Surgical History:   Procedure Laterality Date    ANGIOPLASTY      APPENDECTOMY      COLONOSCOPY  07/03/2017    diverticulosis    FRACTURE SURGERY Left     hip fx with pinning    HYSTERECTOMY           Review of Systems   Constitutional: Negative. HENT: Positive for hearing loss. Eyes: Positive for double vision. Respiratory: Negative. Cardiovascular: Negative. Gastrointestinal: Negative. Genitourinary: Negative. Musculoskeletal: Negative. Skin: Negative. Neurological: Negative. Endo/Heme/Allergies: Negative. Psychiatric/Behavioral: Negative. Exam:   Constitutional:   Vitals:    02/26/18 1156   BP: 138/75   Pulse: 72   SpO2: 99%   Weight: 127 lb (57.6 kg)   Height: 4' 11\" (1.499 m)       General appearance: well-nourished. Eye: No icterus. No blurring of optic disc. Neck: supple  Cardiovascular: No carotid bruit. No lower leg edema with good pulsation. Mental Status: Oriented to person, place, problem, and time. Fluent speech. Good fund of knowledge. Normal attention span and concentration. Cranial Nerves:   II: Visual fields: Full to confrontation  III: Pupils: equal, round, reactive to light  III,IV,VI: Extra Ocular Movements: Intact except with limitation of right lateral gaze. No nystagmus.     V: Facial sensation is intact to pin prick and light touch  VII: Facial strength and movements: Left facial asymmetry affecting upper and lower half with left facial droop. VIII: Hearing: Intact to finger rub bilaterally  IX: Palate elevation is symmetric  XI: Shoulder shrug is intact  XII: Tongue movements are normal  Musculoskeletal: 5/5 in all 4 extremities. Normal tone. Reflexes: Bilateral biceps 2/4, triceps 2/4, brachial radialis 2/4, knee 2/4 and ankle 2/4. Planters: flexor bilaterally. Coordination: no pronator drift, no dysmetria. Finger nose finger testing within normal limits. Sensation: normal to all modalities. Gait/Posture: steady      Medical decision making:  I personally reviewed social history, past medical history, medications, allergy, surgical history, and family history as documented in the patient's electronic health records. Labs and/or neuroimaging and other test results reviewed and discussed with the patient. Reviewed notes from other physicians and outside records. Provided patient education regarding risk, benefits and treatment options as well as adherence to medication regimen and side effect from these medications. Assessment:  New onset diplopia in a patient with history of old left Bell's palsy. Exam today showed partial right sixth nerve palsy. MRI report showed evidence of right CPA angle lesion which could be meningioma. Based on report, her lesion is few mm in size and I'm not sure if it is significant enough to cause partial sixth nerve palsy. Other causes like ischemia, idiopathic or nutritional deficiencies will be excluded. Remote left Bell's palsy  Hypertension  Depression  Remote right MCA stroke without significant deficit.       Plan:  Request MRI CD films to be reviewed  Refer to [facility] regarding the above questions  Check B12, folate, ESR, A1c and DENNIS with next blood testing  Continue aspirin and statin for stroke prevention  Monitor blood pressure closely at home  Continue SSRI  Fall precautions  Long discussion with the patient and

## 2018-04-03 ENCOUNTER — OFFICE VISIT (OUTPATIENT)
Dept: NEUROLOGY | Age: 78
End: 2018-04-03

## 2018-04-03 VITALS
BODY MASS INDEX: 25.2 KG/M2 | HEART RATE: 82 BPM | OXYGEN SATURATION: 94 % | SYSTOLIC BLOOD PRESSURE: 130 MMHG | WEIGHT: 125 LBS | HEIGHT: 59 IN | DIASTOLIC BLOOD PRESSURE: 68 MMHG

## 2018-04-03 DIAGNOSIS — Z86.73 REMOTE HISTORY OF STROKE: ICD-10-CM

## 2018-04-03 DIAGNOSIS — H53.2 DOUBLE VISION: ICD-10-CM

## 2018-04-03 DIAGNOSIS — F31.75 BIPOLAR DISORDER, IN PARTIAL REMISSION, MOST RECENT EPISODE DEPRESSED (HCC): ICD-10-CM

## 2018-04-03 DIAGNOSIS — D32.9 MENINGIOMA (HCC): Primary | ICD-10-CM

## 2018-04-03 DIAGNOSIS — I10 HTN (HYPERTENSION), BENIGN: ICD-10-CM

## 2018-04-03 DIAGNOSIS — G51.0 BELL'S PALSY: ICD-10-CM

## 2018-04-03 PROCEDURE — 99213 OFFICE O/P EST LOW 20 MIN: CPT | Performed by: PSYCHIATRY & NEUROLOGY

## 2018-06-05 ENCOUNTER — TELEPHONE (OUTPATIENT)
Dept: NEUROLOGY | Age: 78
End: 2018-06-05

## 2018-06-06 ENCOUNTER — TELEPHONE (OUTPATIENT)
Dept: NEUROLOGY | Age: 78
End: 2018-06-06

## 2018-06-06 DIAGNOSIS — D32.9 MENINGIOMA (HCC): Primary | ICD-10-CM

## 2018-07-03 ENCOUNTER — OFFICE VISIT (OUTPATIENT)
Dept: NEUROLOGY | Age: 78
End: 2018-07-03

## 2018-07-03 VITALS
OXYGEN SATURATION: 95 % | SYSTOLIC BLOOD PRESSURE: 126 MMHG | HEART RATE: 105 BPM | DIASTOLIC BLOOD PRESSURE: 74 MMHG | BODY MASS INDEX: 25.2 KG/M2 | WEIGHT: 125 LBS | HEIGHT: 59 IN

## 2018-07-03 DIAGNOSIS — I10 HTN (HYPERTENSION), BENIGN: ICD-10-CM

## 2018-07-03 DIAGNOSIS — Z86.73 REMOTE HISTORY OF STROKE: ICD-10-CM

## 2018-07-03 DIAGNOSIS — G51.0 BELL'S PALSY: ICD-10-CM

## 2018-07-03 DIAGNOSIS — D32.9 MENINGIOMA (HCC): Primary | ICD-10-CM

## 2018-07-03 DIAGNOSIS — E78.5 DYSLIPIDEMIA: ICD-10-CM

## 2018-07-03 PROCEDURE — 99214 OFFICE O/P EST MOD 30 MIN: CPT | Performed by: PSYCHIATRY & NEUROLOGY

## 2018-07-03 NOTE — PROGRESS NOTES
The patient came today for follow up regarding: MRI results    Since the patient's last visit, she had a repeat MRI of the brain which I reviewed today with the patient. It did show the same small right CPA lesion consistent with meningioma. No difference from her prior MRI. She denies any new symptoms today. She has occasional right earache and pressure behind her right ear. No ringing in the ears. Symptoms are waxing and waning are daily. Duration is minutes. Degree is moderate. She denies any balance difficulty recent falling. Occasional double vision from her right eye. She has the same chronic left Bell's palsy. Blood pressure is controlled on medication. She takes aspirin and statin at night. No new symptoms today. No recent chest pain, dysphagia or dysarthria or new weakness or numbness or tingling. She is on Paxil for depression. Other review of system was unremarkable. Past Medical History:   Diagnosis Date    Anxiety     Arthritis     Bell's palsy     Depression     Emphysema     GERD (gastroesophageal reflux disease)     Hyperlipidemia     Hypertension     Insomnia     Memory loss     No history of procedure 07/03/2017    colonoscopy    Osteoarthritis     Other disorders of kidney and ureter     S/P angioplasty     Unspecified cerebral artery occlusion with cerebral infarction      Prior to Visit Medications    Medication Sig Taking?  Authorizing Provider   meloxicam (MOBIC) 15 MG tablet TAKE ONE TABLET BY MOUTH ONCE DAILY Yes Hien Denis APRN - CNP   pravastatin (PRAVACHOL) 40 MG tablet Take 1 tablet by mouth every evening Yes Sal Coy MD   timolol (TIMOPTIC) 0.5 % ophthalmic solution Place 1 drop into both eyes 2 times daily Yes Sal Coy MD   esomeprazole (NEXIUM) 40 MG delayed release capsule TAKE ONE CAPSULE BY MOUTH ONCE DAILY Yes Sal Coy MD   loratadine (CLARITIN) 10 MG tablet TAKE ONE TABLET BY MOUTH

## 2018-11-08 ENCOUNTER — OFFICE VISIT (OUTPATIENT)
Dept: NEUROLOGY | Age: 78
End: 2018-11-08
Payer: MEDICARE

## 2018-11-08 VITALS
HEART RATE: 76 BPM | WEIGHT: 125 LBS | HEIGHT: 59 IN | DIASTOLIC BLOOD PRESSURE: 61 MMHG | OXYGEN SATURATION: 90 % | SYSTOLIC BLOOD PRESSURE: 122 MMHG | BODY MASS INDEX: 25.2 KG/M2

## 2018-11-08 DIAGNOSIS — G44.221 CHRONIC TENSION-TYPE HEADACHE, INTRACTABLE: Primary | ICD-10-CM

## 2018-11-08 DIAGNOSIS — I10 HTN (HYPERTENSION), BENIGN: ICD-10-CM

## 2018-11-08 DIAGNOSIS — F34.1 DYSTHYMIA: ICD-10-CM

## 2018-11-08 DIAGNOSIS — D32.9 MENINGIOMA (HCC): ICD-10-CM

## 2018-11-08 DIAGNOSIS — G51.0 BELL'S PALSY: ICD-10-CM

## 2018-11-08 PROCEDURE — 99214 OFFICE O/P EST MOD 30 MIN: CPT | Performed by: PSYCHIATRY & NEUROLOGY

## 2018-11-08 RX ORDER — AMITRIPTYLINE HYDROCHLORIDE 10 MG/1
10 TABLET, FILM COATED ORAL NIGHTLY
Qty: 30 TABLET | Refills: 3 | Status: SHIPPED | OUTPATIENT
Start: 2018-11-08 | End: 2019-01-10 | Stop reason: SDUPTHER

## 2018-11-08 NOTE — PROGRESS NOTES
122/61   Pulse: 76   SpO2: 90%   Weight: 125 lb (56.7 kg)   Height: 4' 11\" (1.499 m)       General appearance: well-nourished. Eye: No icterus. No blurring of optic disc. Neck: supple  Cardiovascular: No carotid bruit. Heart: S1, S2         No lower leg edema with good pulsation. Mental Status: Oriented to person, place, problem, and time. Fluent speech. Good fund of knowledge. Normal attention span and concentration. No change  Cranial Nerves: The same. No change  II: Visual fields: Full to confrontation  III: Pupils: equal, round, reactive to light  III,IV,VI: Extra Ocular Movements are intact. No nystagmus  V: Facial sensation is intact to pin prick and light touch  VII: Facial strength and movements: chronic left facial asymmetry. VIII: Hearing: Intact to finger rub bilaterally more left than right  IX: Palate elevation is symmetric  XI: Shoulder shrug is intact  XII: Tongue movements are normal  Musculoskeletal: 5/5 in all 4 extremities. Normal tone. Reflexes: Bilateral biceps 2/4, triceps 2/4, brachial radialis 2/4, knee 2/4 and ankle 2/4. Planters: flexor bilaterally. Coordination: no pronator drift, no dysmetria. Finger nose finger testing within normal limits. Sensation: normal to all modalities. Gait/Posture: steady  No change    ROS : A 10-12 system review of constitutional, cardiovascular, respiratory, musculoskeletal, endocrine, hematological, skin, SHEENT, genitourinary, psychiatric and neurologic systems was obtained and is unremarkable except as mentioned in my HPI      Medical decision making:  I personally reviewed and updated social history, past medical history, medications, allergy, surgical history, and family history as documented in the patient's electronic health records. Labs and/or neuroimaging and other test results reviewed and discussed with the patient. Reviewed notes from other physicians.   Provided patient education regarding risk, benefits and treatment options as well as adherence to medication regimen and side effect from these medications. Assessment:     Diagnosis Orders   1. Chronic tension-type headache, intractable  amitriptyline (ELAVIL) 10 MG tablet   2. Meningioma (HCC)     3. Bell's palsy     4. HTN (hypertension), benign         Plan:  Long discussion with the patient today regarding the risk of rebound headaches with daily OTC.   Start Elavil 10 mg at night  Long discussion regarding side effect from such medication and possible sedation and falling  Avoid Xanax with amitriptyline  Avoid OTC more than 2 days a week  Repeat MRI of the brain next year  Continue current blood pressure medications and control  Continue aspirin  Statin  Falling precautions  SSRI  Follow-up in 2 month

## 2019-01-10 ENCOUNTER — OFFICE VISIT (OUTPATIENT)
Dept: NEUROLOGY | Age: 79
End: 2019-01-10
Payer: MEDICARE

## 2019-01-10 VITALS
DIASTOLIC BLOOD PRESSURE: 66 MMHG | SYSTOLIC BLOOD PRESSURE: 116 MMHG | WEIGHT: 125 LBS | OXYGEN SATURATION: 96 % | HEIGHT: 59 IN | BODY MASS INDEX: 25.2 KG/M2 | HEART RATE: 98 BPM

## 2019-01-10 DIAGNOSIS — G51.0 BELL'S PALSY: ICD-10-CM

## 2019-01-10 DIAGNOSIS — G44.221 CHRONIC TENSION-TYPE HEADACHE, INTRACTABLE: Primary | ICD-10-CM

## 2019-01-10 DIAGNOSIS — F34.1 DYSTHYMIA: ICD-10-CM

## 2019-01-10 DIAGNOSIS — I10 HTN (HYPERTENSION), BENIGN: ICD-10-CM

## 2019-01-10 DIAGNOSIS — E78.5 DYSLIPIDEMIA: ICD-10-CM

## 2019-01-10 DIAGNOSIS — D32.9 MENINGIOMA (HCC): ICD-10-CM

## 2019-01-10 PROCEDURE — 99214 OFFICE O/P EST MOD 30 MIN: CPT | Performed by: PSYCHIATRY & NEUROLOGY

## 2019-01-10 RX ORDER — AMITRIPTYLINE HYDROCHLORIDE 10 MG/1
20 TABLET, FILM COATED ORAL NIGHTLY
Qty: 60 TABLET | Refills: 2 | Status: SHIPPED | OUTPATIENT
Start: 2019-01-10 | End: 2019-04-12 | Stop reason: SDUPTHER

## 2019-04-12 DIAGNOSIS — G44.221 CHRONIC TENSION-TYPE HEADACHE, INTRACTABLE: ICD-10-CM

## 2019-04-15 RX ORDER — AMITRIPTYLINE HYDROCHLORIDE 10 MG/1
TABLET, FILM COATED ORAL
Qty: 180 TABLET | Refills: 0 | Status: SHIPPED | OUTPATIENT
Start: 2019-04-15 | End: 2019-07-30 | Stop reason: SDUPTHER

## 2019-07-02 ENCOUNTER — TELEPHONE (OUTPATIENT)
Dept: NEUROLOGY | Age: 79
End: 2019-07-02

## 2019-07-08 ENCOUNTER — TELEPHONE (OUTPATIENT)
Dept: NEUROLOGY | Age: 79
End: 2019-07-08

## 2019-07-08 DIAGNOSIS — D32.9 MENINGIOMA (HCC): Primary | ICD-10-CM

## 2019-07-15 ENCOUNTER — TELEPHONE (OUTPATIENT)
Dept: NEUROLOGY | Age: 79
End: 2019-07-15

## 2019-07-15 DIAGNOSIS — D32.9 MENINGIOMA (HCC): Primary | ICD-10-CM

## 2019-07-15 NOTE — TELEPHONE ENCOUNTER
Colleen Gonsalez with [facility] calls stating that I do need to contact insurance to change CPT code from 59115 to 45440 for [address]. She ask that I call/fax her with info phone # [phone] fax # [phone].

## 2019-07-30 ENCOUNTER — OFFICE VISIT (OUTPATIENT)
Dept: NEUROLOGY | Age: 79
End: 2019-07-30
Payer: MEDICARE

## 2019-07-30 VITALS
SYSTOLIC BLOOD PRESSURE: 95 MMHG | DIASTOLIC BLOOD PRESSURE: 57 MMHG | HEIGHT: 59 IN | WEIGHT: 133 LBS | HEART RATE: 106 BPM | BODY MASS INDEX: 26.81 KG/M2 | OXYGEN SATURATION: 95 %

## 2019-07-30 DIAGNOSIS — G51.0 BELL'S PALSY: ICD-10-CM

## 2019-07-30 DIAGNOSIS — D32.9 MENINGIOMA (HCC): ICD-10-CM

## 2019-07-30 DIAGNOSIS — G44.221 CHRONIC TENSION-TYPE HEADACHE, INTRACTABLE: Primary | ICD-10-CM

## 2019-07-30 DIAGNOSIS — I10 HTN (HYPERTENSION), BENIGN: ICD-10-CM

## 2019-07-30 PROCEDURE — 99214 OFFICE O/P EST MOD 30 MIN: CPT | Performed by: PSYCHIATRY & NEUROLOGY

## 2019-07-30 RX ORDER — AMITRIPTYLINE HYDROCHLORIDE 10 MG/1
TABLET, FILM COATED ORAL
Qty: 180 TABLET | Refills: 1 | Status: SHIPPED | OUTPATIENT
Start: 2019-07-30 | End: 2020-01-21 | Stop reason: SDUPTHER

## 2019-11-14 DIAGNOSIS — G44.221 CHRONIC TENSION-TYPE HEADACHE, INTRACTABLE: ICD-10-CM

## 2019-11-14 RX ORDER — AMITRIPTYLINE HYDROCHLORIDE 10 MG/1
TABLET, FILM COATED ORAL
Qty: 180 TABLET | Refills: 1 | OUTPATIENT
Start: 2019-11-14

## 2020-01-03 ENCOUNTER — TELEPHONE (OUTPATIENT)
Dept: NEUROLOGY | Age: 80
End: 2020-01-03

## 2020-01-21 ENCOUNTER — OFFICE VISIT (OUTPATIENT)
Dept: NEUROLOGY | Age: 80
End: 2020-01-21
Payer: MEDICARE

## 2020-01-21 VITALS
BODY MASS INDEX: 26.41 KG/M2 | OXYGEN SATURATION: 97 % | HEIGHT: 59 IN | DIASTOLIC BLOOD PRESSURE: 60 MMHG | SYSTOLIC BLOOD PRESSURE: 138 MMHG | WEIGHT: 131 LBS | HEART RATE: 112 BPM

## 2020-01-21 PROCEDURE — 99214 OFFICE O/P EST MOD 30 MIN: CPT | Performed by: PSYCHIATRY & NEUROLOGY

## 2020-01-21 RX ORDER — GLUCOSAMINE HCL 500 MG
TABLET ORAL
COMMUNITY

## 2020-01-21 RX ORDER — AMITRIPTYLINE HYDROCHLORIDE 10 MG/1
20 TABLET, FILM COATED ORAL NIGHTLY
Qty: 180 TABLET | Refills: 1 | Status: SHIPPED | OUTPATIENT
Start: 2020-01-21 | End: 2020-03-16 | Stop reason: SDUPTHER

## 2020-01-21 NOTE — PROGRESS NOTES
The patient came today for follow up regarding: chronic daily headaches. Since the patient's last visit, she continues to take Elavil 20 mg at night. She denies any side effect of such medication. She sleeps better with amitriptyline. No severe EDS. She does not want to decrease her amitriptyline to 10 mg. She continues to have episodic tension headaches. Degree can be moderate and duration is minutes. Frequency is few times a week but not severe to interfere with ADL. Description is holocranial dull achy pain. No other triggers or other associated symptoms. She does not take any rescue medications. She has chronic history of right CPA meningioma. Last MRI was in July of last year. No recent worsening. She does have chronic left Bell's palsy. History of hypertension controlled on medication. No recent chest pain or visual changes. She takes aspirin daily. No other new symptoms today. Other review of system was unremarkable. Past Medical History:   Diagnosis Date    Anxiety     Arthritis     Bell's palsy     Depression     Emphysema     GERD (gastroesophageal reflux disease)     Hyperlipidemia     Hypertension     Insomnia     Memory loss     No history of procedure 07/03/2017    colonoscopy    Osteoarthritis     Other disorders of kidney and ureter     S/P angioplasty     Unspecified cerebral artery occlusion with cerebral infarction      Prior to Visit Medications    Medication Sig Taking?  Authorizing Provider   Cholecalciferol (VITAMIN D3) 75 MCG (3000 UT) TABS Take by mouth Yes Historical Provider, MD   amitriptyline (ELAVIL) 10 MG tablet Take 2 tablets by mouth nightly Yes Ethel Sacks, MD   pravastatin (PRAVACHOL) 40 MG tablet Take 1 tablet by mouth every evening Yes Jose G Myers MD   timolol (TIMOPTIC) 0.5 % ophthalmic solution Place 1 drop into both eyes 2 times daily Yes Jose G Myers MD   esomeprazole ([employer]) 40 MG delayed release capsule TAKE ONE CAPSULE BY MOUTH ONCE DAILY Yes Hermelinda Mortimer, MD   loratadine (CLARITIN) 10 MG tablet TAKE ONE TABLET BY MOUTH ONCE DAILY Yes Hermelinda Mortimer, MD   minoxidil (ROGAINE) 2 % external solution Apply topically 2 times daily. Yes Hermelinda Mortimer, MD   darifenacin (ENABLEX) 7.5 MG extended release tablet Take 7.5 mg by mouth daily Yes Historical Provider, MD   Multiple Vitamins-Minerals (THERAPEUTIC MULTIVITAMIN-MINERALS) tablet Take 1 tablet by mouth daily Yes Historical Provider, MD   PARoxetine (PAXIL) 30 MG tablet Take 1 tablet by mouth every morning Yes Hermelinda Mortimer, MD   diltiazem (CARDIZEM CD) 120 MG ER capsule TAKE ONE CAPSULE BY MOUTH ONCE DAILY Yes Hermelinda Mortimer, MD   aspirin 325 MG tablet Take 325 mg by mouth daily. Yes Historical Provider, MD   ALPRAZolam Gearldine Nordmann) 2 MG tablet Take 1 mg by mouth nightly as needed .  Yes Historical Provider, MD     Allergies   Allergen Reactions    Amoxicillin Nausea And Vomiting    Ampicillin Nausea And Vomiting    Biaxin [Clarithromycin] Nausea And Vomiting    Ceclor [Cefaclor] Nausea And Vomiting    Cephalexin Nausea And Vomiting    Erythromycin Nausea And Vomiting    Gabapentin Nausea And Vomiting    Iodides Nausea And Vomiting    Lyrica [Pregabalin] Nausea And Vomiting    Penicillins Nausea And Vomiting    Propoxyphene Nausea And Vomiting    Sulfa Antibiotics Nausea And Vomiting    Tuberculin Tests Nausea And Vomiting    Vioxx Nausea And Vomiting     Social History     Tobacco Use    Smoking status: Never Smoker    Smokeless tobacco: Never Used   Substance Use Topics    Alcohol use: No     Alcohol/week: 0.0 standard drinks     Family History   Problem Relation Age of Onset    Diabetes Mother      Past Surgical History:   Procedure Laterality Date    ANGIOPLASTY      APPENDECTOMY      COLONOSCOPY  07/03/2017    diverticulosis    FRACTURE SURGERY Left     hip fx with pinning    regimen and side effect from these medications. Assessment:     Diagnosis Orders   1. Chronic tension-type headache, intractable  amitriptyline (ELAVIL) 10 MG tablet   2. Meningioma ([address]     3. HTN (hypertension), benign     4. Bell's palsy     5. Dysthymia         Plan:  Continue Elavil 20 mg at night  6-month refill  Long discussion with the patient and her son regarding side effect from try cyclic antidepressant in elderly and also discussed the risk of combination with Xanax. She would like to continue with 20 mg at night and she understands such risk.     Continue blood pressure medication  Blood pressure monitor at home  Consider repeating MRI brain in July of next year for her chronic CPA meningioma  Aspirin  Statin for stroke prevention  Continue Paxil  Follow-up 6-month

## 2020-03-16 RX ORDER — AMITRIPTYLINE HYDROCHLORIDE 10 MG/1
20 TABLET, FILM COATED ORAL NIGHTLY
Qty: 180 TABLET | Refills: 1 | Status: SHIPPED | OUTPATIENT
Start: 2020-03-16 | End: 2020-10-14 | Stop reason: SDUPTHER

## 2020-03-16 NOTE — TELEPHONE ENCOUNTER
Last seen 01.21.20    Last office note states to RTO 6 months/07.2020    Next appointment scheduled for 04.29.20.

## 2020-04-06 ENCOUNTER — TELEPHONE (OUTPATIENT)
Dept: NEUROLOGY | Age: 80
End: 2020-04-06

## 2020-04-06 NOTE — TELEPHONE ENCOUNTER
Spoke to Georgia Burkett about switching appt 4/29/20 to a VV. He said she does not have Internet service. She has another appt set for 7/14/20, so he said if that is okay they will just leave that as an option. Last seen 1/21/20. Next appt is 7/14/20. Please advise. Thank you.

## 2020-04-14 ENCOUNTER — VIRTUAL VISIT (OUTPATIENT)
Dept: NEUROLOGY | Age: 80
End: 2020-04-14
Payer: MEDICARE

## 2020-04-14 PROBLEM — I65.21 CAROTID ARTERY STENOSIS WITHOUT CEREBRAL INFARCTION, RIGHT: Status: ACTIVE | Noted: 2020-04-14

## 2020-04-14 PROBLEM — G45.9 TIA (TRANSIENT ISCHEMIC ATTACK): Status: ACTIVE | Noted: 2020-04-14

## 2020-04-14 PROCEDURE — 99442 PR PHYS/QHP TELEPHONE EVALUATION 11-20 MIN: CPT | Performed by: PSYCHIATRY & NEUROLOGY

## 2020-04-14 NOTE — PROGRESS NOTES
with the above plan. She understand the risk of stroke from carotid artery stenosis and she is accepting to take such risk at this point. She is not able to go outside the house to perform any more testing according to her. I affirm this is a Patient Initiated Episode with an Established Patient who has not had a related appointment within my department in the past 7 days or scheduled within the next 24 hours.     Total Time: minutes: 11-20 minutes    Note: not billable if this call serves to triage the patient into an appointment for the relevant concern      Sahara Nguyen

## 2020-04-15 ENCOUNTER — TELEPHONE (OUTPATIENT)
Dept: NEUROLOGY | Age: 80
End: 2020-04-15

## 2020-10-12 ENCOUNTER — TELEPHONE (OUTPATIENT)
Dept: NEUROLOGY | Age: 80
End: 2020-10-12

## 2020-10-14 ENCOUNTER — VIRTUAL VISIT (OUTPATIENT)
Dept: NEUROLOGY | Age: 80
End: 2020-10-14
Payer: MEDICARE

## 2020-10-14 PROCEDURE — 99441 PR PHYS/QHP TELEPHONE EVALUATION 5-10 MIN: CPT | Performed by: PSYCHIATRY & NEUROLOGY

## 2020-10-14 RX ORDER — AMITRIPTYLINE HYDROCHLORIDE 10 MG/1
20 TABLET, FILM COATED ORAL NIGHTLY
Qty: 180 TABLET | Refills: 1 | Status: SHIPPED | OUTPATIENT
Start: 2020-10-14 | End: 2021-09-27

## 2020-10-14 NOTE — PROGRESS NOTES
José Schneider is a 79 y.o. female evaluated via telephone on 10/14/2020. Consent:  She and/or health care decision maker is aware that that she may receive a bill for this telephone service, depending on her insurance coverage, and has provided verbal consent to proceed: Yes      Documentation  I communicated with the patient and/or health care decision maker about . History of TIA and chronic headaches. Details of this discussion including any medical advice provided:     The patient denies any new symptoms today. She takes aspirin and statin. She lives with her son. She fell 2-3 times since her last visit but no significant injury or trauma. She denies any new weakness or numbness or tingling. She uses her walker at all times. The same of remote Bell's palsy. She does have history of chronic tension headache. Frequency is few times a week. Degree is moderate and duration is minutes. Description is holoacranial pain. She is on amitriptyline 20 mg at night. It does help her headaches and sleeping. No side effect of amitriptyline. Blood pressure is controlled on medications. No other new symptoms today. She is on SSRI. No suicidal ideation or thoughts. Other review of system was unremarkable. She will continue with aspirin and statin  Continue amitriptyline 20 mg at night  6-month refill  Discussed risk of falling at home  Avoid OTC  Blood pressure monitor at home  Follow-up 6 months. I affirm this is a Patient Initiated Episode with an Established Patient who has not had a related appointment within my department in the past 7 days or scheduled within the next 24 hours.     Total Time: minutes: 5-10 minutes    Note: not billable if this call serves to triage the patient into an appointment for the relevant concern      Lindon Halsted

## 2020-10-29 RX ORDER — AMITRIPTYLINE HYDROCHLORIDE 10 MG/1
TABLET, FILM COATED ORAL
Qty: 180 TABLET | Refills: 3 | OUTPATIENT
Start: 2020-10-29

## 2021-03-09 ENCOUNTER — TELEPHONE (OUTPATIENT)
Dept: NEUROLOGY | Age: 81
End: 2021-03-09

## 2021-03-09 NOTE — TELEPHONE ENCOUNTER
Pt called just to advise us that she had been in & out of the [facility]) she believes she was released last Saturday 3/6/21. She said she is not quite sure of the dates. She said she had pneumonia, she had fell & has 2 dents in her head, had injured her hip, & broke some ribs. She just thought it was important for us to know what happened & her condition. Last appt was 10/14/20. Next appt is 4/15/21. Thank you.

## 2021-03-19 ENCOUNTER — TELEPHONE (OUTPATIENT)
Dept: NEUROLOGY | Age: 81
End: 2021-03-19

## 2021-09-15 DIAGNOSIS — G44.221 CHRONIC TENSION-TYPE HEADACHE, INTRACTABLE: ICD-10-CM

## 2021-09-15 NOTE — TELEPHONE ENCOUNTER
Last seen: 10/14/20    The patient denies any new symptoms today. She takes aspirin and statin. She lives with her son. She fell 2-3 times since her last visit but no significant injury or trauma. She denies any new weakness or numbness or tingling. She uses her walker at all times. The same of remote Bell's palsy. She does have history of chronic tension headache. Frequency is few times a week. Degree is moderate and duration is minutes. Description is holoacranial pain. She is on amitriptyline 20 mg at night. It does help her headaches and sleeping. No side effect of amitriptyline. Blood pressure is controlled on medications. No other new symptoms today. She is on SSRI. No suicidal ideation or thoughts. Other review of system was unremarkable.     She will continue with aspirin and statin  Continue amitriptyline 20 mg at night  6-month refill  Discussed risk of falling at home  Avoid OTC  Blood pressure monitor at home  Follow-up 6 months. Next apt: None (4/15/21 this 6 month f/u apt was cx & never resched)    Last filled: 10/14/20 (#180 w/ 1 refill)    I've asked Nikki to contact pt to resched the 6 mo f/u apt that she missed in April.

## 2021-09-24 NOTE — TELEPHONE ENCOUNTER
I called & spoke to pt's son Shaunna Rasheed ([facility]) at [phone]. Advised him that we need to schedule an appt for his Mom. He said he will call her around noon & either her or him will call us back to set something up whether it is a in office appt or telephone appt. Pt was last seen in the office 1/21/20. Last VV was 10/14/20. Thank you.      9/27/21-Spoke to pt, she said she is out of her medication & has not been going out of the house if she doesn't need to. She asked to schedule a telephone appt with us. Set it up for 10/20/21 @ 11:45am.  Please refill medication. Thank you.

## 2021-09-27 RX ORDER — AMITRIPTYLINE HYDROCHLORIDE 10 MG/1
20 TABLET, FILM COATED ORAL NIGHTLY
Qty: 180 TABLET | Refills: 0 | Status: SHIPPED | OUTPATIENT
Start: 2021-09-27 | End: 2022-01-27

## 2021-10-26 ENCOUNTER — TELEPHONE (OUTPATIENT)
Dept: NEUROLOGY | Age: 81
End: 2021-10-26

## 2021-10-26 NOTE — TELEPHONE ENCOUNTER
Pt called to apologize her phone was acting up on 10/20/21 when we tried to do a telephone encounter & was calling to see when Dr. Tammy Zuniga would be available to try again for telephone appt? Last appt was 10/14/20. (Telephone)  No future appt scheduled. Please advise. Thank you.

## 2021-11-17 ENCOUNTER — VIRTUAL VISIT (OUTPATIENT)
Dept: NEUROLOGY | Age: 81
End: 2021-11-17
Payer: MEDICARE

## 2021-11-17 DIAGNOSIS — I10 HTN (HYPERTENSION), BENIGN: Primary | ICD-10-CM

## 2021-11-17 DIAGNOSIS — G44.221 CHRONIC TENSION-TYPE HEADACHE, INTRACTABLE: ICD-10-CM

## 2021-11-17 PROCEDURE — 99441 PR PHYS/QHP TELEPHONE EVALUATION 5-10 MIN: CPT | Performed by: PSYCHIATRY & NEUROLOGY

## 2021-11-17 NOTE — PROGRESS NOTES
Alaina Smith is a 79 y.o. female evaluated via telephone on 11/17/2021. Consent:  She and/or health care decision maker is aware that that she may receive a bill for this telephone service, depending on her insurance coverage, and has provided verbal consent to proceed: Yes      Documentation  I communicated with the patient and/or health care decision maker about . chronic headaches. Details of this discussion including any medical advice provided:     The patient denies any new symptoms compared to her recent telephone conversation. She fell few weeks ago and sustained some hip pain. She is unable to seek any medical advice at this time. She takes the same dose of amitriptyline 20 mg at night. No side effect of such medication or severe sedation. Headaches are about the same. Frequency is few a month. Degree is moderate. The same description of holocranial pain but no visual changes, chest pain, neck or back pain or fever or chills. She does not take any rescue medication. She is on the same blood pressure medicine. She takes half pill of Xanax as needed. Other review of system is unremarkable    Discussed risk of polypharmacy with the patient during different sedatives with amitriptyline and Xanax  Continue amitriptyline the same  Discussed risk of falling and seek medical attention if hip pain persists  Continue blood pressure control and medications at home  Aspirin  Follow-up 6 months or sooner if new symptoms arise. I affirm this is a Patient Initiated Episode with an Established Patient who has not had a related appointment within my department in the past 7 days or scheduled within the next 24 hours.     Total Time: minutes: 5-10 minutes    Note: not billable if this call serves to triage the patient into an appointment for the relevant concern      Valere Boxer, MD

## 2022-01-26 DIAGNOSIS — G44.221 CHRONIC TENSION-TYPE HEADACHE, INTRACTABLE: ICD-10-CM

## 2022-01-27 RX ORDER — AMITRIPTYLINE HYDROCHLORIDE 10 MG/1
20 TABLET, FILM COATED ORAL NIGHTLY
Qty: 180 TABLET | Refills: 0 | Status: SHIPPED | OUTPATIENT
Start: 2022-01-27 | End: 2022-04-07

## 2022-01-27 NOTE — TELEPHONE ENCOUNTER
LOV 11-    Pt not able to come into office. Due to her health and no transportation    Can you do a telephone encounter or how do you want to proceed for future refills.

## 2022-04-07 DIAGNOSIS — G44.221 CHRONIC TENSION-TYPE HEADACHE, INTRACTABLE: ICD-10-CM

## 2022-04-08 RX ORDER — AMITRIPTYLINE HYDROCHLORIDE 10 MG/1
20 TABLET, FILM COATED ORAL NIGHTLY
Qty: 180 TABLET | Refills: 1 | Status: SHIPPED | OUTPATIENT
Start: 2022-04-08 | End: 2022-07-07